# Patient Record
Sex: FEMALE | Race: WHITE | NOT HISPANIC OR LATINO | Employment: OTHER | URBAN - METROPOLITAN AREA
[De-identification: names, ages, dates, MRNs, and addresses within clinical notes are randomized per-mention and may not be internally consistent; named-entity substitution may affect disease eponyms.]

---

## 2019-01-11 ENCOUNTER — NEW PATIENT EMERGENCY (OUTPATIENT)
Dept: URBAN - METROPOLITAN AREA CLINIC 39 | Facility: CLINIC | Age: 54
End: 2019-01-11

## 2019-01-11 DIAGNOSIS — H40.033: ICD-10-CM

## 2019-01-11 DIAGNOSIS — H25.13: ICD-10-CM

## 2019-01-11 DIAGNOSIS — S00.12XA: ICD-10-CM

## 2019-01-11 DIAGNOSIS — H43.393: ICD-10-CM

## 2019-01-11 PROCEDURE — 92004 COMPRE OPH EXAM NEW PT 1/>: CPT

## 2019-01-11 PROCEDURE — 92020 GONIOSCOPY: CPT

## 2019-01-11 ASSESSMENT — TONOMETRY
OS_IOP_MMHG: 12
OD_IOP_MMHG: 9

## 2019-01-11 ASSESSMENT — VISUAL ACUITY
OD_CC: 20/20-2
OS_CC: J3
OD_CC: J1
OD_SC: 20/100
OS_SC: <J12
OS_SC: 20/100
OD_SC: <J12
OS_CC: 20/25-2

## 2021-01-11 ENCOUNTER — ESTABLISHED PATIENT (OUTPATIENT)
Dept: URBAN - METROPOLITAN AREA CLINIC 39 | Facility: CLINIC | Age: 56
End: 2021-01-11

## 2021-01-11 DIAGNOSIS — D49.2: ICD-10-CM

## 2021-01-11 PROCEDURE — 99212 OFFICE O/P EST SF 10 MIN: CPT

## 2021-01-11 PROCEDURE — 92285 EXTERNAL OCULAR PHOTOGRAPHY: CPT

## 2021-01-11 PROCEDURE — 67810 INCAL BX EYELID SKN LID MRGN: CPT

## 2021-01-11 RX ORDER — ERYTHROMYCIN 5 MG/G
OINTMENT OPHTHALMIC TWICE A DAY
End: 2021-01-25

## 2021-01-11 ASSESSMENT — VISUAL ACUITY
OD_CC: 20/25-1
OS_CC: 20/25-2

## 2021-02-09 ENCOUNTER — LASIK CONSULTATION (OUTPATIENT)
Dept: URBAN - METROPOLITAN AREA CLINIC 39 | Facility: CLINIC | Age: 56
End: 2021-02-09

## 2021-02-09 DIAGNOSIS — H43.393: ICD-10-CM

## 2021-02-09 DIAGNOSIS — H52.03: ICD-10-CM

## 2021-02-09 DIAGNOSIS — H25.13: ICD-10-CM

## 2021-02-09 DIAGNOSIS — H40.033: ICD-10-CM

## 2021-02-09 DIAGNOSIS — Z97.3: ICD-10-CM

## 2021-02-09 PROCEDURE — 92025 CPTRIZED CORNEAL TOPOGRAPHY: CPT

## 2021-02-09 PROCEDURE — 92136TC INTERFEROMETRY - TECHNICAL COMPONENT

## 2021-02-09 PROCEDURE — 92286 ANT SGM IMG I&R SPECLR MIC: CPT

## 2021-02-09 PROCEDURE — 92134 CPTRZ OPH DX IMG PST SGM RTA: CPT

## 2021-02-09 PROCEDURE — 92250 FUNDUS PHOTOGRAPHY W/I&R: CPT

## 2021-02-09 PROCEDURE — V2799PMN IMPRIMIS PRED-MOXI-NEPAF 5ML

## 2021-02-09 PROCEDURE — 99499RLE REFRACTIVE CONSULT/RLE

## 2021-02-09 ASSESSMENT — VISUAL ACUITY
OD_SC: >J12 BLURRY
OD_CC: 20/20
OD_CC: J4
OD_SC: 20/200
OS_SC: 20/100
OD_BAT: 20/400
OS_CC: 20/30
OS_SC: >J12 BLURRY
OS_CC: J8

## 2021-02-09 ASSESSMENT — TONOMETRY
OS_IOP_MMHG: 12
OD_IOP_MMHG: 12

## 2021-03-15 ENCOUNTER — CONTACT LENS FOLLOW UP (OUTPATIENT)
Dept: URBAN - METROPOLITAN AREA CLINIC 40 | Facility: CLINIC | Age: 56
End: 2021-03-15

## 2021-03-15 DIAGNOSIS — H52.03: ICD-10-CM

## 2021-03-15 PROCEDURE — 92310L CL MGMNT PRE-LASIK TRIAL

## 2021-03-15 ASSESSMENT — VISUAL ACUITY
OD_CC: 20/40 CL
OS_CC: 20/20 CL
OU_CC: J1
OU_CC: 20/20
OS_CC: J6 CL
OD_CC: J1 CL

## 2023-07-28 NOTE — PATIENT DISCUSSION
Despite some risk factors, the patient does not demonstrate definitive evidence of glaucoma at this time.
Glasses Prescription given to patient.
Patient made aware of 24/7 emergency services.
Patient understands condition, prognosis and need for follow up care.
Was previously diagnosed as a suspect and followed with VF testing. No treated has been initiated.
Will monitor. Recommend OCT RNFL for baseline and monitor for change over time.
11-year-old male with history of autism, presenting with dental fracture and possible dental pain.  Per mother, patient was playing with some sores from coxsackievirus in his mouth when he pushed down too hard on a tooth that had caries and ended up cracking the tooth.  Patient went to an outside hospital and then was referred to [facility] for dental.  Per mother, patient is nonverbal however seems to be more upset and fussy which she thinks is related to pain.  Mother is giving Tylenol and Motrin around-the-clock which helps improve the symptoms.  No fever.  No facial swelling.  Exam - Gen - NAD, Head - NCAT, mouth–left lower tooth with fracture noted, no surrounding usual erythema or edema or abscess, pharynx - clear, MMM, Skin - No rash, Extremities - FROM, no edema, erythema, ecchymosis, Neuro - CN 2-12 intact, nl strength and sensation, nl gait.  Plan–dental consult.  Dental advised patient be discharged home with antibiotics and to return Monday for evaluation in dental clinic.  Diagnosis–dental pain.

## 2024-03-18 ENCOUNTER — COMPREHENSIVE EXAM (OUTPATIENT)
Dept: URBAN - METROPOLITAN AREA CLINIC 40 | Facility: CLINIC | Age: 59
End: 2024-03-18

## 2024-03-18 DIAGNOSIS — H43.393: ICD-10-CM

## 2024-03-18 DIAGNOSIS — H52.4: ICD-10-CM

## 2024-03-18 DIAGNOSIS — H02.834: ICD-10-CM

## 2024-03-18 DIAGNOSIS — H02.831: ICD-10-CM

## 2024-03-18 DIAGNOSIS — H52.03: ICD-10-CM

## 2024-03-18 PROCEDURE — 92014 COMPRE OPH EXAM EST PT 1/>: CPT

## 2024-03-18 PROCEDURE — 92015 DETERMINE REFRACTIVE STATE: CPT

## 2024-03-18 PROCEDURE — 92310-1 LEVEL 1 CONTACT LENS MANAGEMENT

## 2024-03-18 ASSESSMENT — VISUAL ACUITY
OD_CC: 20/20
OS_CC: J3
OS_SC: 20/100+2
OS_CC: 20/20
OS_SC: <J12
OD_CC: J1+
OD_SC: J12
OD_SC: 20/100

## 2024-03-18 ASSESSMENT — TONOMETRY
OS_IOP_MMHG: 10
OD_IOP_MMHG: 09

## 2024-04-15 ENCOUNTER — CONTACT LENSES/GLASSES VISIT (OUTPATIENT)
Dept: URBAN - METROPOLITAN AREA CLINIC 40 | Facility: CLINIC | Age: 59
End: 2024-04-15

## 2024-04-15 DIAGNOSIS — H52.03: ICD-10-CM

## 2024-04-15 PROCEDURE — 92310F

## 2024-04-15 ASSESSMENT — VISUAL ACUITY
OD_CC: 20/50
OU_CC: J1
OS_CC: 20/25+2
OU_CC: 20/40
OS_CC: J12
OD_CC: J1